# Patient Record
Sex: MALE | Race: WHITE | NOT HISPANIC OR LATINO | Employment: FULL TIME | URBAN - NONMETROPOLITAN AREA
[De-identification: names, ages, dates, MRNs, and addresses within clinical notes are randomized per-mention and may not be internally consistent; named-entity substitution may affect disease eponyms.]

---

## 2020-02-21 ENCOUNTER — HOSPITAL ENCOUNTER (EMERGENCY)
Facility: HOSPITAL | Age: 40
Discharge: HOME | End: 2020-02-21
Attending: EMERGENCY MEDICINE | Admitting: EMERGENCY MEDICINE
Payer: COMMERCIAL

## 2020-02-21 ENCOUNTER — APPOINTMENT (EMERGENCY)
Dept: NON INVASIVE DIAGNOSTICS | Facility: HOSPITAL | Age: 40
End: 2020-02-21
Payer: COMMERCIAL

## 2020-02-21 VITALS
SYSTOLIC BLOOD PRESSURE: 119 MMHG | RESPIRATION RATE: 18 BRPM | DIASTOLIC BLOOD PRESSURE: 66 MMHG | HEART RATE: 78 BPM | TEMPERATURE: 98.1 F | OXYGEN SATURATION: 99 %

## 2020-02-21 DIAGNOSIS — M66.0 BAKER'S CYST, RUPTURED: Primary | ICD-10-CM

## 2020-02-21 LAB
ABO GROUP BLD: NORMAL
ALBUMIN SERPL BCP-MCNC: 3 G/DL (ref 3.5–5)
ALBUMIN SERPL BCP-MCNC: 3.5 G/DL (ref 3.5–5)
ALP SERPL-CCNC: 86 U/L (ref 46–116)
ALP SERPL-CCNC: 93 U/L (ref 46–116)
ALT SERPL W P-5'-P-CCNC: 18 U/L (ref 12–78)
ALT SERPL W P-5'-P-CCNC: 22 U/L (ref 12–78)
ANION GAP SERPL CALCULATED.3IONS-SCNC: 5 MMOL/L (ref 4–13)
ANION GAP SERPL CALCULATED.3IONS-SCNC: 7 MMOL/L (ref 4–13)
APTT PPP: 31 SECONDS (ref 23–37)
AST SERPL W P-5'-P-CCNC: 12 U/L (ref 5–45)
AST SERPL W P-5'-P-CCNC: 13 U/L (ref 5–45)
BASOPHILS # BLD AUTO: 0.03 THOUSANDS/ΜL (ref 0–0.1)
BASOPHILS NFR BLD AUTO: 0 % (ref 0–1)
BILIRUB SERPL-MCNC: 0.48 MG/DL (ref 0.2–1)
BILIRUB SERPL-MCNC: 0.73 MG/DL (ref 0.2–1)
BLD GP AB SCN SERPL QL: NEGATIVE
BUN SERPL-MCNC: 38 MG/DL (ref 5–25)
BUN SERPL-MCNC: 38 MG/DL (ref 5–25)
CALCIUM SERPL-MCNC: 8 MG/DL (ref 8.3–10.1)
CALCIUM SERPL-MCNC: 9 MG/DL (ref 8.3–10.1)
CHLORIDE SERPL-SCNC: 103 MMOL/L (ref 100–108)
CHLORIDE SERPL-SCNC: 106 MMOL/L (ref 100–108)
CO2 SERPL-SCNC: 30 MMOL/L (ref 21–32)
CO2 SERPL-SCNC: 30 MMOL/L (ref 21–32)
CREAT SERPL-MCNC: 2.6 MG/DL (ref 0.6–1.3)
CREAT SERPL-MCNC: 2.69 MG/DL (ref 0.6–1.3)
EOSINOPHIL # BLD AUTO: 0.21 THOUSAND/ΜL (ref 0–0.61)
EOSINOPHIL NFR BLD AUTO: 3 % (ref 0–6)
ERYTHROCYTE [DISTWIDTH] IN BLOOD BY AUTOMATED COUNT: 11.9 % (ref 11.6–15.1)
GFR SERPL CREATININE-BSD FRML MDRD: 29 ML/MIN/1.73SQ M
GFR SERPL CREATININE-BSD FRML MDRD: 30 ML/MIN/1.73SQ M
GLUCOSE SERPL-MCNC: 84 MG/DL (ref 65–140)
GLUCOSE SERPL-MCNC: 96 MG/DL (ref 65–140)
HCT VFR BLD AUTO: 36.9 % (ref 36.5–49.3)
HGB BLD-MCNC: 11.7 G/DL (ref 12–17)
IMM GRANULOCYTES # BLD AUTO: 0.02 THOUSAND/UL (ref 0–0.2)
IMM GRANULOCYTES NFR BLD AUTO: 0 % (ref 0–2)
INR PPP: 1.41 (ref 0.84–1.19)
LACTATE SERPL-SCNC: 1.2 MMOL/L (ref 0.5–2)
LYMPHOCYTES # BLD AUTO: 1.62 THOUSANDS/ΜL (ref 0.6–4.47)
LYMPHOCYTES NFR BLD AUTO: 23 % (ref 14–44)
MCH RBC QN AUTO: 29 PG (ref 26.8–34.3)
MCHC RBC AUTO-ENTMCNC: 31.7 G/DL (ref 31.4–37.4)
MCV RBC AUTO: 91 FL (ref 82–98)
MONOCYTES # BLD AUTO: 0.66 THOUSAND/ΜL (ref 0.17–1.22)
MONOCYTES NFR BLD AUTO: 9 % (ref 4–12)
NEUTROPHILS # BLD AUTO: 4.62 THOUSANDS/ΜL (ref 1.85–7.62)
NEUTS SEG NFR BLD AUTO: 65 % (ref 43–75)
NRBC BLD AUTO-RTO: 0 /100 WBCS
PLATELET # BLD AUTO: 285 THOUSANDS/UL (ref 149–390)
PMV BLD AUTO: 10.1 FL (ref 8.9–12.7)
POTASSIUM SERPL-SCNC: 4.7 MMOL/L (ref 3.5–5.3)
POTASSIUM SERPL-SCNC: 4.8 MMOL/L (ref 3.5–5.3)
PROT SERPL-MCNC: 6.9 G/DL (ref 6.4–8.2)
PROT SERPL-MCNC: 8.1 G/DL (ref 6.4–8.2)
PROTHROMBIN TIME: 17.4 SECONDS (ref 11.6–14.5)
RBC # BLD AUTO: 4.04 MILLION/UL (ref 3.88–5.62)
RH BLD: POSITIVE
SODIUM SERPL-SCNC: 140 MMOL/L (ref 136–145)
SODIUM SERPL-SCNC: 141 MMOL/L (ref 136–145)
SPECIMEN EXPIRATION DATE: NORMAL
TROPONIN I SERPL-MCNC: <0.02 NG/ML
WBC # BLD AUTO: 7.16 THOUSAND/UL (ref 4.31–10.16)

## 2020-02-21 PROCEDURE — 36415 COLL VENOUS BLD VENIPUNCTURE: CPT | Performed by: PHYSICIAN ASSISTANT

## 2020-02-21 PROCEDURE — 93971 EXTREMITY STUDY: CPT

## 2020-02-21 PROCEDURE — 86900 BLOOD TYPING SEROLOGIC ABO: CPT | Performed by: PHYSICIAN ASSISTANT

## 2020-02-21 PROCEDURE — 99284 EMERGENCY DEPT VISIT MOD MDM: CPT | Performed by: EMERGENCY MEDICINE

## 2020-02-21 PROCEDURE — 96360 HYDRATION IV INFUSION INIT: CPT

## 2020-02-21 PROCEDURE — 85025 COMPLETE CBC W/AUTO DIFF WBC: CPT | Performed by: PHYSICIAN ASSISTANT

## 2020-02-21 PROCEDURE — 84484 ASSAY OF TROPONIN QUANT: CPT | Performed by: PHYSICIAN ASSISTANT

## 2020-02-21 PROCEDURE — 86901 BLOOD TYPING SEROLOGIC RH(D): CPT | Performed by: PHYSICIAN ASSISTANT

## 2020-02-21 PROCEDURE — 93005 ELECTROCARDIOGRAM TRACING: CPT

## 2020-02-21 PROCEDURE — 83605 ASSAY OF LACTIC ACID: CPT | Performed by: PHYSICIAN ASSISTANT

## 2020-02-21 PROCEDURE — 80053 COMPREHEN METABOLIC PANEL: CPT | Performed by: PHYSICIAN ASSISTANT

## 2020-02-21 PROCEDURE — 85730 THROMBOPLASTIN TIME PARTIAL: CPT | Performed by: PHYSICIAN ASSISTANT

## 2020-02-21 PROCEDURE — 86850 RBC ANTIBODY SCREEN: CPT | Performed by: PHYSICIAN ASSISTANT

## 2020-02-21 PROCEDURE — 99284 EMERGENCY DEPT VISIT MOD MDM: CPT

## 2020-02-21 PROCEDURE — 85610 PROTHROMBIN TIME: CPT | Performed by: PHYSICIAN ASSISTANT

## 2020-02-21 RX ORDER — AMLODIPINE BESYLATE AND BENAZEPRIL HYDROCHLORIDE 5; 20 MG/1; MG/1
CAPSULE ORAL
COMMUNITY

## 2020-02-21 RX ORDER — METOPROLOL SUCCINATE 25 MG/1
25 TABLET, EXTENDED RELEASE ORAL DAILY
COMMUNITY

## 2020-02-21 RX ADMIN — SODIUM CHLORIDE 1000 ML: 0.9 INJECTION, SOLUTION INTRAVENOUS at 15:21

## 2020-02-21 NOTE — DISCHARGE INSTRUCTIONS
Today you where found to have a ruptured baker cyst  Please follow up with your PCP for follow up  for lab work, today we found a decrease in your kidney function but we are unsure what your baseline is due to no previous lab work

## 2020-02-21 NOTE — ED NOTES
Verbal orders from [facility] to hang 1000mL NSS and then re-check patient's CMP        Bhumi Chairez RN  02/21/20 1252

## 2020-02-21 NOTE — ED PROVIDER NOTES
History  Chief Complaint   Patient presents with    Leg Swelling     Patient presents to the ED via walk-in for evaluation of left leg edema, pain, tenderness onset in january to the left knee  Pain is severe  Trauma to the knee previous years ago  Leg is edematous, tender to the posterior popliteal region and calf, erythema noted  No open wounds  The patient is a 79-year-old male with a past medical history of hypertension presents emergency department today with a complaint of left lower leg swelling  Patient states that he has had intermittent swelling of the left lower extremity for quite some time  Patient states that he had evaluation by his primary care doctor approximately 2 weeks ago where a lower extremity ultrasound was negative for any acute blood clot however did find that he does have some vascular insufficiency in his left lower leg  Patient states that he typically does suffer from increased left lower leg edema greater than the right and was educated by his primary care doctor to apply compression stockings along with  elevation  Patient states that yesterday while at work he had a sudden onset of sharp pain his left calf and had an increase in swelling in his left lower extremity  Patient states that overnight he did elevate his left lower extremity which did decrease some of his swelling in his left lower leg however he is still experiencing some discomfort and swelling today  Patient denies any falls or injuries  Patient denies any fevers or chills, shortness of breath or chest pain        Leg Pain   Location:  Leg  Time since incident:  1 day  Injury: no    Leg location:  L lower leg  Pain details:     Quality:  Sharp    Radiates to:  Does not radiate    Severity:  Moderate    Onset quality:  Sudden    Duration:  1 day    Progression:  Improving  Dislocation: no    Foreign body present:  No foreign bodies  Tetanus status:  Up to date  Prior injury to area:  Yes  Relieved by: Elevation  Worsened by: Activity  Ineffective treatments:  Elevation  Associated symptoms: no back pain, no decreased ROM, no fatigue, no fever, no itching, no muscle weakness, no neck pain, no numbness, no stiffness, no swelling and no tingling        Prior to Admission Medications   Prescriptions Last Dose Informant Patient Reported? Taking? amLODIPine-benazepril (LOTREL 5-20) 5-20 MG per capsule 2/21/2020 at Unknown time  Yes Yes   metoprolol succinate (TOPROL-XL) 25 mg 24 hr tablet 2/21/2020 at Unknown time  Yes Yes   Sig: Take 25 mg by mouth daily      Facility-Administered Medications: None       Past Medical History:   Diagnosis Date    Hypertension        History reviewed  No pertinent surgical history  History reviewed  No pertinent family history  I have reviewed and agree with the history as documented  Social History     Tobacco Use    Smoking status: Never Smoker    Smokeless tobacco: Never Used   Substance Use Topics    Alcohol use: Yes     Comment: socially    Drug use: Never       Review of Systems   Constitutional: Negative for chills, fatigue and fever  HENT: Negative for ear pain  Eyes: Negative for pain and visual disturbance  Respiratory: Negative for shortness of breath and stridor  Cardiovascular: Negative for chest pain and palpitations  Gastrointestinal: Negative for abdominal pain, nausea and vomiting  Genitourinary: Negative for dysuria and hematuria  Musculoskeletal: Negative for arthralgias, back pain, neck pain and stiffness  Skin: Negative for color change, itching and rash  Neurological: Negative for seizures and speech difficulty  Physical Exam  Physical Exam   Constitutional: He is oriented to person, place, and time  He appears well-developed and well-nourished  No distress  HENT:   Head: Normocephalic and atraumatic  Mouth/Throat: Oropharynx is clear and moist    Eyes: Pupils are equal, round, and reactive to light   EOM are normal  Neck: Normal range of motion  Cardiovascular: Normal rate and regular rhythm  No murmur heard  Pulses:       Dorsalis pedis pulses are 2+ on the right side, and 2+ on the left side  Posterior tibial pulses are 2+ on the right side, and 2+ on the left side  Pulmonary/Chest: Effort normal and breath sounds normal  No respiratory distress  Abdominal: Soft  Bowel sounds are normal  There is no tenderness  Musculoskeletal:        Left knee: Normal         Left ankle: Normal         Right lower leg: He exhibits swelling  He exhibits no tenderness, no bony tenderness and no deformity  Neurological: He is alert and oriented to person, place, and time  Skin: Skin is warm and dry  Capillary refill takes less than 2 seconds  Psychiatric: He has a normal mood and affect  His behavior is normal    Nursing note and vitals reviewed        Vital Signs  ED Triage Vitals [02/21/20 1259]   Temperature Pulse Respirations Blood Pressure SpO2   98 1 °F (36 7 °C) 84 18 122/74 100 %      Temp Source Heart Rate Source Patient Position - Orthostatic VS BP Location FiO2 (%)   Temporal Monitor Sitting Left arm --      Pain Score       4           Vitals:    02/21/20 1259 02/21/20 1445 02/21/20 1649 02/21/20 1808   BP: 122/74 107/63 109/64 119/66   Pulse: 84 68 78 78   Patient Position - Orthostatic VS: Sitting  Lying Sitting         Visual Acuity      ED Medications  Medications   sodium chloride 0 9 % bolus 1,000 mL (0 mL Intravenous Stopped 2/21/20 1640)       Diagnostic Studies  Results Reviewed     Procedure Component Value Units Date/Time    Comprehensive metabolic panel [[id number]]  (Abnormal) Collected:  02/21/20 1643    Lab Status:  Final result Specimen:  Blood from Arm, Right Updated:  02/21/20 1703     Sodium 141 mmol/L      Potassium 4 8 mmol/L      Chloride 106 mmol/L      CO2 30 mmol/L      ANION GAP 5 mmol/L      BUN 38 mg/dL      Creatinine 2 60 mg/dL      Glucose 84 mg/dL      Calcium 8 0 mg/dL AST 12 U/L      ALT 18 U/L      Alkaline Phosphatase 86 U/L      Total Protein 6 9 g/dL      Albumin 3 0 g/dL      Total Bilirubin 0 48 mg/dL      eGFR 30 ml/min/1 73sq m     Narrative:       [place] guidelines for Chronic Kidney Disease (CKD):     Stage 1 with normal or high GFR (GFR > 90 mL/min/1 73 square meters)    Stage 2 Mild CKD (GFR = 60-89 mL/min/1 73 square meters)    Stage 3A Moderate CKD (GFR = 45-59 mL/min/1 73 square meters)    Stage 3B Moderate CKD (GFR = 30-44 mL/min/1 73 square meters)    Stage 4 Severe CKD (GFR = 15-29 mL/min/1 73 square meters)    Stage 5 End Stage CKD (GFR <15 mL/min/1 73 square meters)  Note: GFR calculation is accurate only with a steady state creatinine    Lactic acid, plasma x2 [[id number]]  (Normal) Collected:  02/21/20 1332    Lab Status:  Final result Specimen:  Blood from Arm, Right Updated:  02/21/20 1403     LACTIC ACID 1 2 mmol/L     Narrative:       Result may be elevated if tourniquet was used during collection      Troponin I [[id number]]  (Normal) Collected:  02/21/20 1332    Lab Status:  Final result Specimen:  Blood from Arm, Right Updated:  02/21/20 1403     Troponin I <0 02 ng/mL     Comprehensive metabolic panel [[id number]]  (Abnormal) Collected:  02/21/20 1332    Lab Status:  Final result Specimen:  Blood from Arm, Right Updated:  02/21/20 1357     Sodium 140 mmol/L      Potassium 4 7 mmol/L      Chloride 103 mmol/L      CO2 30 mmol/L      ANION GAP 7 mmol/L      BUN 38 mg/dL      Creatinine 2 69 mg/dL      Glucose 96 mg/dL      Calcium 9 0 mg/dL      AST 13 U/L      ALT 22 U/L      Alkaline Phosphatase 93 U/L      Total Protein 8 1 g/dL      Albumin 3 5 g/dL      Total Bilirubin 0 73 mg/dL      eGFR 29 ml/min/1 73sq m     Narrative:       [place] guidelines for Chronic Kidney Disease (CKD):     Stage 1 with normal or high GFR (GFR > 90 mL/min/1 73 square meters)    Stage 2 Mild CKD (GFR = 60-89 mL/min/1 73 square meters)    Stage 3A Moderate CKD (GFR = 45-59 mL/min/1 73 square meters)    Stage 3B Moderate CKD (GFR = 30-44 mL/min/1 73 square meters)    Stage 4 Severe CKD (GFR = 15-29 mL/min/1 73 square meters)    Stage 5 End Stage CKD (GFR <15 mL/min/1 73 square meters)  Note: GFR calculation is accurate only with a steady state creatinine    Protime-INR [[id number]]  (Abnormal) Collected:  02/21/20 1332    Lab Status:  Final result Specimen:  Blood from Arm, Right Updated:  02/21/20 1353     Protime 17 4 seconds      INR 1 41    APTT [[id number]]  (Normal) Collected:  02/21/20 1332    Lab Status:  Final result Specimen:  Blood from Arm, Right Updated:  02/21/20 1353     PTT 31 seconds     CBC and differential [[id number]]  (Abnormal) Collected:  02/21/20 1332    Lab Status:  Final result Specimen:  Blood from Arm, Right Updated:  02/21/20 1339     WBC 7 16 Thousand/uL      RBC 4 04 Million/uL      Hemoglobin 11 7 g/dL      Hematocrit 36 9 %      MCV 91 fL      MCH 29 0 pg      MCHC 31 7 g/dL      RDW 11 9 %      MPV 10 1 fL      Platelets 213 Thousands/uL      nRBC 0 /100 WBCs      Neutrophils Relative 65 %      Immat GRANS % 0 %      Lymphocytes Relative 23 %      Monocytes Relative 9 %      Eosinophils Relative 3 %      Basophils Relative 0 %      Neutrophils Absolute 4 62 Thousands/µL      Immature Grans Absolute 0 02 Thousand/uL      Lymphocytes Absolute 1 62 Thousands/µL      Monocytes Absolute 0 66 Thousand/µL      Eosinophils Absolute 0 21 Thousand/µL      Basophils Absolute 0 03 Thousands/µL                  VAS lower limb venous duplex study, unilateral/limited    (Results Pending)              Procedures  ECG 12 Lead Documentation Only  Date/Time: 2/21/2020 6:56 PM  Performed by: Chandra Mazariegos PA-C  Authorized by: Chandra Mazariegos PA-C     Indications / Diagnosis:  69  Patient location:  ED  Previous ECG:     Previous ECG:  Unavailable  Interpretation:     Interpretation: normal    Rate: ECG rate:  69    ECG rate assessment: normal    Rhythm:     Rhythm: sinus rhythm    Ectopy:     Ectopy: none    QRS:     QRS axis:  Normal  Conduction:     Conduction: normal    ST segments:     ST segments:  Normal  T waves:     T waves: normal    Comments:      Reviewed with Dr Amandeep Davison              ED Course         MDM  Number of Diagnoses or Management Options  Baker's cyst, ruptured:   Diagnosis management comments: ddx cellulitis, DVT, heart failure, ACS   Patient found to have ruptured bakers cyst of left leg  The patient did not have any fevers or leukocytosis and left lower leg soft tissue was soft and not seen cellulitic in nature without any probable injury or infectious source therefore cellulitis was not felt as likely  EKG and troponin was negative for any acute abnormality  Ultrasound of the left lower extremity did not demonstrate any acute DVT or thrombophlebitis however did note a ruptured Baker cyst which would explain the patient's increased pain in his left lower leg and increased swelling  Patient was educated on elevation and compression stockings of the left lower extremity  Patient given work note    Patient's lab work with his CMP illustrated a creatinine of 2  69 and GFR of 29  We did not have any prior lab work to compare to  Patient was given 1 L fluid while in the emergency department and P was rechecked which had mild improvement  When asked the patient was unsure about his previous kidney function  Patient states my family doctor follows it  Patient was given a printout of the lab work from today and told to follow-up with with his primary care doctor  Patient expressed understanding  I told the patient to follow up at least on Monday for repeat lab work  Patient was instructed to return as needed to the emergency department or if anything worsens and he expressed understanding and was in agreement with treatment plan         Amount and/or Complexity of Data Reviewed  Clinical lab tests: ordered and reviewed  Tests in the radiology section of CPT®: ordered and reviewed  Discuss the patient with other providers: yes          Disposition  Final diagnoses:   Baker's cyst, ruptured     Time reflects when diagnosis was documented in both MDM as applicable and the Disposition within this note     Time User Action Codes Description Comment    2/21/2020  5:21 PM Savanah Clemons Add [M66 0] Baker's cyst, ruptured       ED Disposition     ED Disposition Condition Date/Time Comment    Discharge Stable Fri Feb 21, 2020  5:20 PM Darshana Mann discharge to home/self care  Follow-up Information     Follow up With Specialties Details Why Contact Info    Brendolyn Apley, DO Family Medicine Schedule an appointment as soon as possible for a visit in 3 days  [address] [place] [address] [address]  [phone]            Discharge Medication List as of 2/21/2020  5:24 PM      CONTINUE these medications which have NOT CHANGED    Details   amLODIPine-benazepril (LOTREL 5-20) 5-20 MG per capsule Historical Med      metoprolol succinate (TOPROL-XL) 25 mg 24 hr tablet Take 25 mg by mouth daily, Historical Med           No discharge procedures on file      PDMP Review     None          ED Provider  Electronically Signed by           Piter Garrido PA-C  02/21/20 1911       Scarlett Gilliam MD  02/22/20 1444

## 2020-02-22 PROCEDURE — 93971 EXTREMITY STUDY: CPT | Performed by: SURGERY

## 2020-02-23 LAB
ATRIAL RATE: 69 BPM
P AXIS: 45 DEGREES
PR INTERVAL: 170 MS
QRS AXIS: 69 DEGREES
QRSD INTERVAL: 86 MS
QT INTERVAL: 372 MS
QTC INTERVAL: 398 MS
T WAVE AXIS: 66 DEGREES
VENTRICULAR RATE: 69 BPM

## 2020-02-23 PROCEDURE — 93010 ELECTROCARDIOGRAM REPORT: CPT | Performed by: INTERNAL MEDICINE

## 2023-06-22 ENCOUNTER — APPOINTMENT (EMERGENCY)
Dept: CT IMAGING | Facility: HOSPITAL | Age: 43
End: 2023-06-22
Payer: COMMERCIAL

## 2023-06-22 ENCOUNTER — HOSPITAL ENCOUNTER (EMERGENCY)
Facility: HOSPITAL | Age: 43
Discharge: HOME | End: 2023-06-22
Attending: EMERGENCY MEDICINE
Payer: COMMERCIAL

## 2023-06-22 VITALS
DIASTOLIC BLOOD PRESSURE: 81 MMHG | WEIGHT: 270 LBS | OXYGEN SATURATION: 97 % | HEART RATE: 71 BPM | HEIGHT: 75 IN | SYSTOLIC BLOOD PRESSURE: 127 MMHG | BODY MASS INDEX: 33.57 KG/M2 | RESPIRATION RATE: 18 BRPM | TEMPERATURE: 97.5 F

## 2023-06-22 DIAGNOSIS — N39.0 UTI (URINARY TRACT INFECTION): Primary | ICD-10-CM

## 2023-06-22 LAB
ALBUMIN SERPL BCP-MCNC: 3.9 G/DL (ref 3.5–5)
ALP SERPL-CCNC: 104 U/L (ref 34–104)
ALT SERPL W P-5'-P-CCNC: 12 U/L (ref 7–52)
ANION GAP SERPL CALCULATED.3IONS-SCNC: 16 MMOL/L
APTT PPP: 28 SECONDS (ref 23–37)
AST SERPL W P-5'-P-CCNC: 13 U/L (ref 13–39)
BACTERIA UR QL AUTO: ABNORMAL /HPF
BASOPHILS # BLD AUTO: 0.06 THOUSANDS/ÂΜL (ref 0–0.1)
BASOPHILS NFR BLD AUTO: 1 % (ref 0–1)
BILIRUB SERPL-MCNC: 0.47 MG/DL (ref 0.2–1)
BILIRUB UR QL STRIP: ABNORMAL
BUN SERPL-MCNC: 39 MG/DL (ref 5–25)
CALCIUM SERPL-MCNC: 9.2 MG/DL (ref 8.4–10.2)
CHLORIDE SERPL-SCNC: 105 MMOL/L (ref 96–108)
CLARITY UR: ABNORMAL
CO2 SERPL-SCNC: 27 MMOL/L (ref 21–32)
COLOR UR: ABNORMAL
CREAT SERPL-MCNC: 3.78 MG/DL (ref 0.6–1.3)
EOSINOPHIL # BLD AUTO: 0.32 THOUSAND/ÂΜL (ref 0–0.61)
EOSINOPHIL NFR BLD AUTO: 4 % (ref 0–6)
ERYTHROCYTE [DISTWIDTH] IN BLOOD BY AUTOMATED COUNT: 11.9 % (ref 11.6–15.1)
GFR SERPL CREATININE-BSD FRML MDRD: 18 ML/MIN/1.73SQ M
GLUCOSE SERPL-MCNC: 86 MG/DL (ref 65–140)
GLUCOSE UR STRIP-MCNC: NEGATIVE MG/DL
HCT VFR BLD AUTO: 35.8 % (ref 36.5–49.3)
HGB BLD-MCNC: 11.6 G/DL (ref 12–17)
HGB UR QL STRIP.AUTO: ABNORMAL
IMM GRANULOCYTES # BLD AUTO: 0.02 THOUSAND/UL (ref 0–0.2)
IMM GRANULOCYTES NFR BLD AUTO: 0 % (ref 0–2)
INR PPP: 1.21 (ref 0.84–1.19)
KETONES UR STRIP-MCNC: NEGATIVE MG/DL
LACTATE SERPL-SCNC: 0.4 MMOL/L (ref 0.5–2)
LEUKOCYTE ESTERASE UR QL STRIP: ABNORMAL
LYMPHOCYTES # BLD AUTO: 1.91 THOUSANDS/ÂΜL (ref 0.6–4.47)
LYMPHOCYTES NFR BLD AUTO: 27 % (ref 14–44)
MCH RBC QN AUTO: 30.1 PG (ref 26.8–34.3)
MCHC RBC AUTO-ENTMCNC: 32.4 G/DL (ref 31.4–37.4)
MCV RBC AUTO: 93 FL (ref 82–98)
MONOCYTES # BLD AUTO: 0.61 THOUSAND/ÂΜL (ref 0.17–1.22)
MONOCYTES NFR BLD AUTO: 8 % (ref 4–12)
NEUTROPHILS # BLD AUTO: 4.3 THOUSANDS/ÂΜL (ref 1.85–7.62)
NEUTS SEG NFR BLD AUTO: 60 % (ref 43–75)
NITRITE UR QL STRIP: POSITIVE
NON-SQ EPI CELLS URNS QL MICRO: ABNORMAL /HPF
NRBC BLD AUTO-RTO: 0 /100 WBCS
PH UR STRIP.AUTO: 5 [PH]
PLATELET # BLD AUTO: 255 THOUSANDS/UL (ref 149–390)
PMV BLD AUTO: 10 FL (ref 8.9–12.7)
POTASSIUM SERPL-SCNC: 4.9 MMOL/L (ref 3.5–5.3)
PROT SERPL-MCNC: 7.6 G/DL (ref 6.4–8.4)
PROT UR STRIP-MCNC: ABNORMAL MG/DL
PROTHROMBIN TIME: 15.4 SECONDS (ref 11.6–14.5)
RBC # BLD AUTO: 3.86 MILLION/UL (ref 3.88–5.62)
RBC #/AREA URNS AUTO: ABNORMAL /HPF
SODIUM SERPL-SCNC: 148 MMOL/L (ref 135–147)
SP GR UR STRIP.AUTO: <=1.005 (ref 1–1.03)
UROBILINOGEN UR QL STRIP.AUTO: 0.2 E.U./DL
WBC # BLD AUTO: 7.22 THOUSAND/UL (ref 4.31–10.16)
WBC #/AREA URNS AUTO: ABNORMAL /HPF

## 2023-06-22 PROCEDURE — 85025 COMPLETE CBC W/AUTO DIFF WBC: CPT | Performed by: EMERGENCY MEDICINE

## 2023-06-22 PROCEDURE — G1004 CDSM NDSC: HCPCS

## 2023-06-22 PROCEDURE — 80053 COMPREHEN METABOLIC PANEL: CPT | Performed by: EMERGENCY MEDICINE

## 2023-06-22 PROCEDURE — 74176 CT ABD & PELVIS W/O CONTRAST: CPT

## 2023-06-22 PROCEDURE — 36415 COLL VENOUS BLD VENIPUNCTURE: CPT | Performed by: EMERGENCY MEDICINE

## 2023-06-22 PROCEDURE — 85610 PROTHROMBIN TIME: CPT | Performed by: EMERGENCY MEDICINE

## 2023-06-22 PROCEDURE — 81001 URINALYSIS AUTO W/SCOPE: CPT | Performed by: EMERGENCY MEDICINE

## 2023-06-22 PROCEDURE — 85730 THROMBOPLASTIN TIME PARTIAL: CPT | Performed by: EMERGENCY MEDICINE

## 2023-06-22 PROCEDURE — 83605 ASSAY OF LACTIC ACID: CPT | Performed by: EMERGENCY MEDICINE

## 2023-06-22 RX ORDER — CEPHALEXIN 250 MG/1
500 CAPSULE ORAL ONCE
Status: COMPLETED | OUTPATIENT
Start: 2023-06-22 | End: 2023-06-22

## 2023-06-22 RX ORDER — CEPHALEXIN 500 MG/1
500 CAPSULE ORAL EVERY 12 HOURS SCHEDULED
Qty: 20 CAPSULE | Refills: 0 | Status: SHIPPED | OUTPATIENT
Start: 2023-06-22 | End: 2023-07-02

## 2023-06-22 RX ADMIN — CEPHALEXIN 500 MG: 250 CAPSULE ORAL at 17:20

## 2023-06-22 RX ADMIN — SODIUM CHLORIDE 1000 ML: 0.9 INJECTION, SOLUTION INTRAVENOUS at 15:57

## 2023-06-22 NOTE — ED PROVIDER NOTES
History  Chief Complaint   Patient presents with   • Blood in Urine     Pt states blood in urine started this afternoon  Hx stage 4 polycystic kidney disease/      Patient is a 69-year-old male with a history of polycystic kidney disease, presenting to the emergency department complaining of gross hematuria first noted earlier today, he denies any pain, no fever or chills, no recent trauma or injury, no history of similar symptoms previously, denies dysuria, urinary frequency or urgency, he does not take a blood thinner          Prior to Admission Medications   Prescriptions Last Dose Informant Patient Reported? Taking? amLODIPine-benazepril (LOTREL 5-20) 5-20 MG per capsule   Yes No   metoprolol succinate (TOPROL-XL) 25 mg 24 hr tablet   Yes No   Sig: Take 25 mg by mouth daily      Facility-Administered Medications: None       Past Medical History:   Diagnosis Date   • Hypertension    • Polycystic kidney disease        History reviewed  No pertinent surgical history  History reviewed  No pertinent family history  I have reviewed and agree with the history as documented  E-Cigarette/Vaping   • E-Cigarette Use Never User      E-Cigarette/Vaping Substances     Social History     Tobacco Use   • Smoking status: Never   • Smokeless tobacco: Never   Vaping Use   • Vaping Use: Never used   Substance Use Topics   • Alcohol use: Yes     Comment: socially   • Drug use: Never       Review of Systems   Constitutional: Negative  HENT: Negative  Eyes: Negative  Respiratory: Negative  Cardiovascular: Negative  Gastrointestinal: Negative  Endocrine: Negative  Genitourinary: Positive for hematuria  Musculoskeletal: Negative  Skin: Negative  Allergic/Immunologic: Negative  Neurological: Negative  Hematological: Negative  Psychiatric/Behavioral: Negative  Physical Exam  Physical Exam  Constitutional:       Appearance: He is well-developed     HENT:      Head: Normocephalic and atraumatic  Eyes:      Conjunctiva/sclera: Conjunctivae normal       Pupils: Pupils are equal, round, and reactive to light  Cardiovascular:      Rate and Rhythm: Normal rate  Pulmonary:      Effort: Pulmonary effort is normal    Abdominal:      Palpations: Abdomen is soft  Musculoskeletal:         General: Normal range of motion  Cervical back: Normal range of motion and neck supple  Skin:     General: Skin is warm and dry  Neurological:      Mental Status: He is alert and oriented to person, place, and time           Vital Signs  ED Triage Vitals [06/22/23 1530]   Temperature Pulse Respirations Blood Pressure SpO2   97 5 °F (36 4 °C) 67 18 126/75 99 %      Temp Source Heart Rate Source Patient Position - Orthostatic VS BP Location FiO2 (%)   Temporal Monitor Sitting Left arm --      Pain Score       No Pain           Vitals:    06/22/23 1630 06/22/23 1645 06/22/23 1700 06/22/23 1715   BP: 126/84 126/85 127/81 127/81   Pulse: 68 70 75 71   Patient Position - Orthostatic VS:             Visual Acuity      ED Medications  Medications   sodium chloride 0 9 % bolus 1,000 mL (0 mL Intravenous Stopped 6/22/23 1647)   cephalexin (KEFLEX) capsule 500 mg (500 mg Oral Given 6/22/23 1720)       Diagnostic Studies  Results Reviewed     Procedure Component Value Units Date/Time    Protime-INR [[id number]]  (Abnormal) Collected: 06/22/23 1548    Lab Status: Final result Specimen: Blood from Arm, Left Updated: 06/22/23 1627     Protime 15 4 seconds      INR 1 21    APTT [[id number]]  (Normal) Collected: 06/22/23 1548    Lab Status: Final result Specimen: Blood from Arm, Left Updated: 06/22/23 1627     PTT 28 seconds     Comprehensive metabolic panel [[id number]]  (Abnormal) Collected: 06/22/23 1548    Lab Status: Final result Specimen: Blood from Arm, Left Updated: 06/22/23 1619     Sodium 148 mmol/L      Potassium 4 9 mmol/L      Chloride 105 mmol/L      CO2 27 mmol/L      ANION GAP 16 mmol/L      BUN 39 mg/dL Creatinine 3 78 mg/dL      Glucose 86 mg/dL      Calcium 9 2 mg/dL      AST 13 U/L      ALT 12 U/L      Alkaline Phosphatase 104 U/L      Total Protein 7 6 g/dL      Albumin 3 9 g/dL      Total Bilirubin 0 47 mg/dL      eGFR 18 ml/min/1 73sq m     Narrative:      [place] guidelines for Chronic Kidney Disease (CKD):   •  Stage 1 with normal or high GFR (GFR > 90 mL/min/1 73 square meters)  •  Stage 2 Mild CKD (GFR = 60-89 mL/min/1 73 square meters)  •  Stage 3A Moderate CKD (GFR = 45-59 mL/min/1 73 square meters)  •  Stage 3B Moderate CKD (GFR = 30-44 mL/min/1 73 square meters)  •  Stage 4 Severe CKD (GFR = 15-29 mL/min/1 73 square meters)  •  Stage 5 End Stage CKD (GFR <15 mL/min/1 73 square meters)  Note: GFR calculation is accurate only with a steady state creatinine    Lactic acid, plasma (w/reflex if result > 2 0) [[id number]]  (Abnormal) Collected: 06/22/23 1548    Lab Status: Final result Specimen: Blood from Arm, Left Updated: 06/22/23 1619     LACTIC ACID 0 4 mmol/L     Narrative:      Result may be elevated if tourniquet was used during collection      Urine Microscopic [[id number]]  (Abnormal) Collected: 06/22/23 1541    Lab Status: Final result Specimen: Urine, Clean Catch Updated: 06/22/23 1556     RBC, UA Innumerable /hpf      WBC, UA 4-10 /hpf      Epithelial Cells Occasional /hpf      Bacteria, UA Occasional /hpf     CBC and differential [[id number]]  (Abnormal) Collected: 06/22/23 1548    Lab Status: Final result Specimen: Blood from Arm, Left Updated: 06/22/23 1554     WBC 7 22 Thousand/uL      RBC 3 86 Million/uL      Hemoglobin 11 6 g/dL      Hematocrit 35 8 %      MCV 93 fL      MCH 30 1 pg      MCHC 32 4 g/dL      RDW 11 9 %      MPV 10 0 fL      Platelets 176 Thousands/uL      nRBC 0 /100 WBCs      Neutrophils Relative 60 %      Immat GRANS % 0 %      Lymphocytes Relative 27 %      Monocytes Relative 8 %      Eosinophils Relative 4 %      Basophils Relative 1 % Neutrophils Absolute 4 30 Thousands/µL      Immature Grans Absolute 0 02 Thousand/uL      Lymphocytes Absolute 1 91 Thousands/µL      Monocytes Absolute 0 61 Thousand/µL      Eosinophils Absolute 0 32 Thousand/µL      Basophils Absolute 0 06 Thousands/µL     UA w Reflex to Microscopic w Reflex to Culture [[id number]]  (Abnormal) Collected: 06/22/23 1541    Lab Status: Final result Specimen: Urine, Clean Catch Updated: 06/22/23 1548     Color, UA Red     Clarity, UA Cloudy     Specific Gravity, UA <=1 005     pH, UA 5 0     Leukocytes, UA Small     Nitrite, UA Positive     Protein,  (2+) mg/dl      Glucose, UA Negative mg/dl      Ketones, UA Negative mg/dl      Urobilinogen, UA 0 2 E U /dl      Bilirubin, UA Small     Occult Blood, UA Large                 CT renal stone study abdomen pelvis without contrast   Final Result by Reshma Alcala MD (06/22 1645)   1  In this patient with known polycystic kidney disease, extensive/innumerable bilateral renal cysts  Bilaterally, hyperdense cysts are present consistent with intracystic hemorrhage or proteinaceous material, and there are small intrarenal    nonobstructing calculi   2  No hydronephrosis or ureteral stone  Unremarkable urinary bladder   3  Innumerable hepatic cysts and a left seminal vesicle cyst are also seen  Workstation performed: [id number]                    Procedures  Procedures         ED Course  ED Course as of 06/22/23 2009   u Jun 22, 2023   2009 APTT   2009 Protime-INR(!)   2009 Comprehensive metabolic panel(!)   0226 Lactic acid, plasma (w/reflex if result > 2 0)(! )   2009 CBC and differential(!)   2009 Urine Microscopic(!)   2009 UA w Reflex to Microscopic w Reflex to Culture(!)   2009 CT renal stone study abdomen pelvis without contrast                               SBIRT 22yo+    Flowsheet Row Most Recent Value   Initial Alcohol Screen: US AUDIT-C     1   How often do you have a drink containing alcohol? 0 Filed at: 06/22/2023 1529   2  How many drinks containing alcohol do you have on a typical day you are drinking? 0 Filed at: 06/22/2023 1529   3a  Male UNDER 65: How often do you have five or more drinks on one occasion? 0 Filed at: 06/22/2023 1529   Audit-C Score 0 Filed at: 06/22/2023 1529   STAN: How many times in the past year have you    Used an illegal drug or used a prescription medication for non-medical reasons? Never Filed at: 06/22/2023 1529                    Medical Decision Making  Patient presents for symptoms of hematuria, findings consistent with acute urinary tract infection  No systemic symptoms, no fever, vital signs are stable  Patient is well-appearing and nonseptic appearing  Low suspicion for acute pyelonephritis given lack of fever, CVA tenderness or systemic features  Low suspicion for kidney stone or infected stone  Low suspicion for testicular torsion, PID or appendicitis  Patient will be started on oral antibiotics for treatment of urinary tract infection, advise close follow-up with PCP or return if symptoms worsen      UTI (urinary tract infection): acute illness or injury  Amount and/or Complexity of Data Reviewed  External Data Reviewed: labs and notes  Labs: ordered  Decision-making details documented in ED Course  Radiology: ordered  Risk  Prescription drug management  Disposition  Final diagnoses:   UTI (urinary tract infection)     Time reflects when diagnosis was documented in both MDM as applicable and the Disposition within this note     Time User Action Codes Description Comment    6/22/2023  5:15 PM Renetta Cuevas Add [N39 0] UTI (urinary tract infection)       ED Disposition     ED Disposition   Discharge    Condition   Stable    Date/Time   Thu Jun 22, 2023  5:15 PM    Comment   Mao Jennings discharge to home/self care                 Follow-up Information     Follow up With Specialties Details Why Contact Info    Iris Bhardwaj, DO Family Medicine In 2 days  [address]  [address]  [phone]            Discharge Medication List as of 6/22/2023  5:15 PM      START taking these medications    Details   cephalexin (KEFLEX) 500 mg capsule Take 1 capsule (500 mg total) by mouth every 12 (twelve) hours for 10 days, Starting Thu 6/22/2023, Until Sun 7/2/2023, Normal         CONTINUE these medications which have NOT CHANGED    Details   amLODIPine-benazepril (LOTREL 5-20) 5-20 MG per capsule Historical Med      metoprolol succinate (TOPROL-XL) 25 mg 24 hr tablet Take 25 mg by mouth daily, Historical Med             No discharge procedures on file      PDMP Review     None          ED Provider  Electronically Signed by           Ami Escudero DO  06/22/23 2009

## 2023-06-22 NOTE — ED NOTES
Following discharge patient left cell phone in room  Call placed to patients spouse to inform of property left behind  Patient will return to  phone        Han Gunter RN  06/22/23 8091